# Patient Record
Sex: FEMALE | Race: WHITE | Employment: OTHER | URBAN - METROPOLITAN AREA
[De-identification: names, ages, dates, MRNs, and addresses within clinical notes are randomized per-mention and may not be internally consistent; named-entity substitution may affect disease eponyms.]

---

## 2023-10-09 PROBLEM — H52.4 MYOPIA OF BOTH EYES WITH ASTIGMATISM AND PRESBYOPIA: Status: ACTIVE | Noted: 2023-10-09

## 2023-10-09 PROBLEM — H35.30 AMD (AGE-RELATED MACULAR DEGENERATION), BILATERAL: Status: ACTIVE | Noted: 2023-10-09

## 2023-10-09 PROBLEM — Z96.1: Status: ACTIVE | Noted: 2023-10-09

## 2023-10-09 PROBLEM — Z96.1 PSEUDOPHAKIA OF BOTH EYES: Status: ACTIVE | Noted: 2023-10-09

## 2023-10-09 PROBLEM — Z17.0 MALIGNANT NEOPLASM OF UPPER-OUTER QUADRANT OF LEFT BREAST IN FEMALE, ESTROGEN RECEPTOR POSITIVE (MULTI): Status: ACTIVE | Noted: 2023-10-09

## 2023-10-09 PROBLEM — C50.412 MALIGNANT NEOPLASM OF UPPER-OUTER QUADRANT OF LEFT BREAST IN FEMALE, ESTROGEN RECEPTOR POSITIVE (MULTI): Status: ACTIVE | Noted: 2023-10-09

## 2023-10-09 PROBLEM — H26.491 PCO (POSTERIOR CAPSULAR OPACIFICATION), RIGHT: Status: ACTIVE | Noted: 2023-10-09

## 2023-10-09 PROBLEM — H52.13 MYOPIA OF BOTH EYES WITH ASTIGMATISM AND PRESBYOPIA: Status: ACTIVE | Noted: 2023-10-09

## 2023-10-09 PROBLEM — C77.3: Status: ACTIVE | Noted: 2023-10-09

## 2023-10-09 PROBLEM — H52.31 ANISOMETROPIA: Status: ACTIVE | Noted: 2023-10-09

## 2023-10-09 PROBLEM — C50.912 BREAST CANCER, LEFT BREAST (MULTI): Status: ACTIVE | Noted: 2023-10-09

## 2023-10-09 PROBLEM — H52.203 MYOPIA OF BOTH EYES WITH ASTIGMATISM AND PRESBYOPIA: Status: ACTIVE | Noted: 2023-10-09

## 2023-10-09 PROBLEM — C50.919 CARCINOMA OF BREAST (MULTI): Status: ACTIVE | Noted: 2023-10-09

## 2023-10-09 PROBLEM — H31.013: Status: ACTIVE | Noted: 2023-10-09

## 2023-10-09 PROBLEM — H35.353 CME (CYSTOID MACULAR EDEMA), BILATERAL: Status: ACTIVE | Noted: 2023-10-09

## 2023-10-09 PROBLEM — R91.8 LUNG MASS: Status: ACTIVE | Noted: 2023-10-09

## 2023-10-09 PROBLEM — C50.919: Status: ACTIVE | Noted: 2023-10-09

## 2023-10-09 RX ORDER — ANASTROZOLE 1 MG/1
TABLET ORAL
COMMUNITY
Start: 2020-02-07

## 2023-10-09 RX ORDER — FOLIC ACID 1 MG/1
TABLET ORAL
COMMUNITY
Start: 2014-06-25

## 2023-10-09 RX ORDER — LANOLIN ALCOHOL/MO/W.PET/CERES
CREAM (GRAM) TOPICAL
COMMUNITY
Start: 2014-06-25

## 2023-10-09 RX ORDER — ASPIRIN 81 MG/1
TABLET ORAL
COMMUNITY
Start: 2014-06-25

## 2023-10-09 RX ORDER — PREDNISOLONE ACETATE 10 MG/ML
1 SUSPENSION/ DROPS OPHTHALMIC 4 TIMES DAILY
COMMUNITY
Start: 2017-10-13

## 2023-10-09 RX ORDER — METOPROLOL TARTRATE 25 MG/1
TABLET, FILM COATED ORAL
COMMUNITY
Start: 2014-03-26

## 2023-10-09 RX ORDER — ATORVASTATIN CALCIUM 10 MG/1
TABLET, FILM COATED ORAL
COMMUNITY
Start: 2014-06-25

## 2023-10-10 ENCOUNTER — OFFICE VISIT (OUTPATIENT)
Dept: HEMATOLOGY/ONCOLOGY | Facility: CLINIC | Age: 83
End: 2023-10-10
Payer: MEDICARE

## 2023-10-10 ENCOUNTER — OFFICE VISIT (OUTPATIENT)
Dept: PALLIATIVE MEDICINE | Facility: CLINIC | Age: 83
End: 2023-10-10
Payer: MEDICARE

## 2023-10-10 VITALS
BODY MASS INDEX: 30.76 KG/M2 | RESPIRATION RATE: 16 BRPM | TEMPERATURE: 96.1 F | OXYGEN SATURATION: 90 % | SYSTOLIC BLOOD PRESSURE: 142 MMHG | WEIGHT: 196.21 LBS | HEART RATE: 77 BPM | DIASTOLIC BLOOD PRESSURE: 72 MMHG

## 2023-10-10 DIAGNOSIS — Z51.5 PALLIATIVE CARE ENCOUNTER: Primary | ICD-10-CM

## 2023-10-10 DIAGNOSIS — Z17.0 MALIGNANT NEOPLASM OF UPPER-OUTER QUADRANT OF LEFT BREAST IN FEMALE, ESTROGEN RECEPTOR POSITIVE (MULTI): Primary | ICD-10-CM

## 2023-10-10 DIAGNOSIS — C50.412 MALIGNANT NEOPLASM OF UPPER-OUTER QUADRANT OF LEFT BREAST IN FEMALE, ESTROGEN RECEPTOR POSITIVE (MULTI): Primary | ICD-10-CM

## 2023-10-10 PROCEDURE — 99212 OFFICE O/P EST SF 10 MIN: CPT

## 2023-10-10 PROCEDURE — 99214 OFFICE O/P EST MOD 30 MIN: CPT | Performed by: STUDENT IN AN ORGANIZED HEALTH CARE EDUCATION/TRAINING PROGRAM

## 2023-10-10 PROCEDURE — 1125F AMNT PAIN NOTED PAIN PRSNT: CPT | Performed by: STUDENT IN AN ORGANIZED HEALTH CARE EDUCATION/TRAINING PROGRAM

## 2023-10-10 PROCEDURE — 1159F MED LIST DOCD IN RCRD: CPT | Performed by: STUDENT IN AN ORGANIZED HEALTH CARE EDUCATION/TRAINING PROGRAM

## 2023-10-10 PROCEDURE — 1159F MED LIST DOCD IN RCRD: CPT

## 2023-10-10 PROCEDURE — 1125F AMNT PAIN NOTED PAIN PRSNT: CPT

## 2023-10-10 ASSESSMENT — PAIN SCALES - GENERAL: PAINLEVEL: 5

## 2023-10-10 NOTE — PROGRESS NOTES
Patient ID: Edilma Davis is a 83 y.o. female.    The patient presents to clinic today for her history of breast cancer.    Diagnostic/Therapeutic History:  Cancer History:          Breast Cancer         AJCC Edition: 7th, Diagnosis Date: 10-Jul-2013, Stage IIB, T2 pN1 M0      Treatment Synopsis:    1. Clinical 4 cm left-sided invasive breast cancer. Biopsy revealing invasive breast cancer. Estrogen receptor strongly positive at greater than 95%, progesterone  receptor weakly positive at 20%, and HER-2/tyrel negative by immunohistochemistry with a score of 0.      2. Biopsy of a left axillary lymph node is positive for cancer.      3. Bone scan is negative for metastases. 4. Computed tomography of the chest revealing some small 3 mm scattered nodules. Computed tomography of the abdomen revealing multiple liver cysts confirmed by ultrasound. 5. Started on exemestane. 6. Episode  of TIA but elected to continue on exemestane.     8. Progression in left breast and axilla by mammography, so exemestane stopped and started on Tamoxifen on 8/10/18.      9. Repeat PET/CT showing uptake in left breast and axilla, as well as new nodule in right lobe of the lung, so tamoxifen stopped and started on letrozole.      10. Progression on letrozole, so abemaciclib added to treatment.      11.PET cerianna torso on 10/13/22 showed the previously described FDG avid lesions consistent with sites of malignant involvement, these include irregular soft tissue lesion in the left breast, the pleural-based right upper lobe pulmonary nodule in the  focal lesion within the T8 vertebral body.  She has multiple additional osseous lesions consistent with menstrual slides of osseous metastatic disease with prominent subcentimeter trick right supraclavicular and subcarinal lymph nodes demonstrating focal  F-18 fluoroestradiall level and avidity consistent with alla mets- To note that patient has been off verzenio for few months, she stopped it, agreed  to restart at lower dose but has been taking intermittently, however has stopped      05/2023: scans reviewed: she has increaase in lung nodule and subcarinal LN, new bone mets- agreed to restart verzenio       History of Present Illness (HPI)/Interval History:    Patient has diarrhea, on verzenio affecting her quality of life. Decided to stop late September. No fever, no chills, no chest pain, no SOB    Review of Systems:  14-point ROS otherwise negative, as per HPI.    Past Medical History:   Diagnosis Date    Essential (primary) hypertension 08/18/2013    Benign essential hypertension    Malignant neoplasm of unspecified site of left female breast (CMS/[facility]ly [facility]) 02/28/2020    Breast cancer, left breast       Past Surgical History:   Procedure Laterality Date    CATARACT EXTRACTION  09/19/2017    Cataract Extraction    TONSILLECTOMY  04/02/2014    Tonsillectomy       Social History     Socioeconomic History    Marital status:      Spouse name: Not on file    Number of children: Not on file    Years of education: Not on file    Highest education level: Not on file   Occupational History    Not on file   Tobacco Use    Smoking status: Not on file    Smokeless tobacco: Not on file   Substance and Sexual Activity    Alcohol use: Not on file    Drug use: Not on file    Sexual activity: Not on file   Other Topics Concern    Not on file   Social History Narrative    Not on file     Social Determinants of Health     Financial Resource Strain: Not on file   Food Insecurity: Not on file   Transportation Needs: Not on file   Physical Activity: Not on file   Stress: Not on file   Social Connections: Not on file   Intimate Partner Violence: Not on file   Housing Stability: Not on file       No Known Allergies      Current Outpatient Medications:     abemaciclib (Verzenio) 100 mg tablet, TAKE ONE (1) TABLET BY MOUTH 2 TIMES DAILY. (Patient not taking: Reported on 10/10/2023), Disp: 60 tablet, Rfl: 3    anastrozole (Arimidex)  1 mg tablet, Take by mouth., Disp: , Rfl:     aspirin 81 mg EC tablet, Take by mouth., Disp: , Rfl:     atorvastatin (Lipitor) 10 mg tablet, Take by mouth., Disp: , Rfl:     folic acid (Folvite) 1 mg tablet, Take by mouth., Disp: , Rfl:     metoprolol tartrate (Lopressor) 25 mg tablet, Take by mouth., Disp: , Rfl:     prednisoLONE acetate (Pred-Forte) 1 % ophthalmic suspension, Administer 1 drop into the right eye 4 times a day., Disp: , Rfl:     thiamine 100 mg tablet, Take by mouth., Disp: , Rfl:      Objective    BSA: There is no height or weight on file to calculate BSA.  There were no vitals taken for this visit.        Physical Exam    Constitutional: Well developed, awake/alert/oriented  x3, no distress, alert and cooperative   Eyes: EOMI, clear sclerae   Head/Neck: Neck supple, thyroid without mass or tenderness,  no JVD, trachea midline, no bruits   Respiratory/Thorax: Patent airways, CTAB, normal  breath sounds with good chest expansion, thorax symmetric   Cardiovascular: Regular, rate and rhythm, no murmurs,  2+ equal pulses of the extremities, normal S 1and S 2   Gastrointestinal: Nondistended, soft, non-tender,  no rebound tenderness or guarding, no masses, no organomegaly, +BS   Breast: left inverted nipple with palpated mass   Psychological: Appropriate mood and behavior   Skin: Warm and dry, no lesions, no rashes       Laboratory Data:  Lab Results   Component Value Date    WBC 6.6 06/07/2023    HGB 12.7 06/07/2023    HCT 39.7 06/07/2023    MCV 92 06/07/2023     06/07/2023       Chemistry    Lab Results   Component Value Date/Time     06/07/2023 1243    K 4.0 06/07/2023 1243     06/07/2023 1243    CO2 29 06/07/2023 1243    BUN 19 06/07/2023 1243    CREATININE 0.97 06/07/2023 1243    Lab Results   Component Value Date/Time    CALCIUM 9.3 06/07/2023 1243    ALKPHOS 99 06/07/2023 1243    AST 22 06/07/2023 1243    ALT 11 06/07/2023 1243    BILITOT 0.4 06/07/2023 124              Assessment/Plan:    83 year old female with multiple comorbidities, with history of metastatic, stage IV breast cancer with recent PET scan showing irregular soft tissue left breast,  pleural-based right upper lobe nodule, supraclavicular subcarinal lymph nodes (new), and multiple osseous metastatic disease(new).  She had multiple lines of hormonal treatments, with chemotherapy not being considered because of her poor performance status.   Review cancer history for details of her previous treatment.  She  was on letrozole and Verzenio and her disease has been stable overall, she does mention having stopped Verzenio in the past 3 months at least because of diarrhea which would explain  her progression of disease.     PET cerianna torso on 10/13/22 showed the previously described FDG avid lesions consistent with sites of malignant involvement, these include irregular soft tissue lesion in the left  breast, the pleural-based right upper lobe pulmonary nodule in the focal lesion within the T8 vertebral body.  She has multiple additional osseous lesions consistent with menstrual slides of osseous metastatic disease with prominent subcentimeter trick  right supraclavicular and subcarinal lymph nodes demonstrating focal F-18 fluoroestradiall level and avidity consistent with alla mets-      discussed with her the results of her PET/CT on and we agreed to restart Verzenio at a lower dose 100mg daily and to follow-up closely.  she was taking verzenio every couple of days and then stopped.     repeat scans reviewed: she has increaase in lung nodule and subcarinal LN, new bone mets- we discussed no treatment with fup scans vs restarting verzenio- agreable to restart verzenio once a day- (that was in june and she never started it)    Started verzenio then stopped because of diarrhea- and per discussion Edilma wants to preserve a good quality of life. We will follow up in early December. In the meantime, instructed to call for  any questions/concerns. We did discuss that cancer will get worse off treatment. Agreable with plan. Discussed in presence of JAIMIE Jackson    RTC 12/12  Fup KAPIL Almeida          At least 35 minutes of direct consultation was spent reviewing the patient's chart as well as discussing and  reviewing the  cancer care plan including educating and answering  questions and concerns, greater than 50 percent spent in counseling and coordination  of care.            Alley Kapadia MD  Hematology and Medical Oncology  [facility]

## 2023-10-10 NOTE — PATIENT INSTRUCTIONS
Follow up with Dr Kapadia in 2 months   If you have any questions or concerns please call [phone]

## 2023-10-11 ENCOUNTER — TELEPHONE (OUTPATIENT)
Dept: ADMISSION | Facility: HOSPITAL | Age: 83
End: 2023-10-11
Payer: MEDICARE

## 2023-10-11 NOTE — TELEPHONE ENCOUNTER
Pt's [facility] called for a refill on her Verzenio. There are refills on file so I will call and let them know. However, it is marked as no longer taking in her med list from yesterday. Message sent to the team to confirm if she is taking this.

## 2023-10-12 NOTE — PROGRESS NOTES
SUPPORTIVE AND PALLIATIVE ONCOLOGY OUTPATIENT FOLLOW-UP      SERVICE DATE: 10/10/2023    Subjective   HISTORY OF PRESENT ILLNESS: Edilma Davis is a 83 y.o. female who presents with a history of left sided invasive breast cancer. She has been treated with Exemestane, tamoxifen, and Letrozole with progression  on each treatment. She was started on Verzenio for progression. Repeat CT scan 05/2023 with increase in lung nodule, subcarinal LN, and new bone mets. She restarted Verzenio, but has not been taking this consistently.     Pain Assessment:  Pain Score:   5/10  Location: Knee  Description: chronic knee pain. No change with the increase in gabapentin for pain. States that her leg throbs constantly.     Symptom Assessment:  Pain:somewhat  Headache: none  Dizziness:none  Lack of energy: a little  Difficulty sleeping: none  Worrying: none  Anxiety: none  Depression: none  Pain in mouth/swallowing: none  Dry mouth: none  Taste changes: none  Shortness of breath: none  Lack of appetite: a little when on Verzenio    Nausea: none  Vomiting: none  Constipation: none  Diarrhea: a little while on Verzenio. Tried Imodium with no relief.   Sore muscles: none  Numbness or tingling in hands/feet/other: none  Weight loss: none  Other: none      Information obtained from: chart review and interview of patient  ______________________________________________________________________        Objective        PHYSICAL EXAMINATION   Vital signs reviewed  Vitals:    10/10/23 1454   BP: 142/72   Pulse: 77   Resp: 16   Temp: 35.6 °C (96.1 °F)   SpO2: 90%       Physical Exam  Musculoskeletal:      Comments: In a wheelchair   Skin:     General: Skin is warm.   Neurological:      Mental Status: She is alert and oriented to person, place, and time.   Psychiatric:         Mood and Affect: Mood normal.         ASSESSMENT/PLAN    Pain  Pain is: chronic  Type: somatic and neuropathic  Pain control: well-controlled  Home regimen:   - Continue  Tylenol 500-1000 mg every 8 hours as needed  - Continue Gabapentin 300 mg TID. Instructed her to take 300 in the morning and 600 at bedtime to make sure she gets the full dosing in.     Diarrhea:  - Continue Imodium 2 mg with each episode of diarrhea. Do not exceed 16 mg/day.     Supportive and Palliative Oncology encounter:  Spoke with patient and anthony at bedside  Emotional support provided  Coordination of care  We will continue to follow and address symptoms as needed    Medical Decision Making/Goals of Care/Advance Care Planning:  Patient's current clinical condition, including diagnosis, prognosis, and management plan, and goals of care were discussed.   Life limiting disease: metastatic malignancy  Family: Supportive   Performance status: Major limitations due to disease process and diarrhea  Information:Wants full disclosure  Minimum acceptable outcome/QOL:  patient does not want to continue with Verzenio. She understands the risk of her disease spreading if she does not want any cancer directed therapy. Patient verbalizes understanding and would like to continue with no treatment. She declined in home palliative care at this time.     Advance Directives  Existence of Advance Directives:Yes, documentation or copy in medical record  Decision maker: HCPOA is Anthony Jackson  Code Status: Full code    Next Follow-Up Visit:  Return to clinic as needed    Signature and billing  Medical complexity was low level due to due to complexity of problems, extensive data review, and high risk of management/treatment.  Time was spent on the following: Prep Time, Time Directly with Patient/Family/Caregiver, Documentation Time. Total time spent: 15      Data  Diagnostic tests and information reviewed for today's visit:  Conversation with primary team, Medications         Some elements copied from Nina Ng note on 8/29/23, the elements have been updated and all reflect current decision making from today,  10/12/2023.      Plan of Care discussed with: Provider, Patient, and Family/Significant Other    SIGNATURE: KAPIL Hanks    Contact information:  Supportive and Palliative Oncology  Monday-Friday 8 AM-5 PM  Phone:  [phone], press option #5, then option #1.   Or Epic Secure Chat

## 2023-10-16 ENCOUNTER — SPECIALTY PHARMACY (OUTPATIENT)
Dept: PHARMACY | Facility: CLINIC | Age: 83
End: 2023-10-16

## 2023-11-08 ENCOUNTER — PHARMACY VISIT (OUTPATIENT)
Dept: PHARMACY | Facility: CLINIC | Age: 83
End: 2023-11-08
Payer: COMMERCIAL

## 2023-11-08 PROCEDURE — RXMED WILLOW AMBULATORY MEDICATION CHARGE

## 2023-12-08 ENCOUNTER — SPECIALTY PHARMACY (OUTPATIENT)
Dept: PHARMACY | Facility: CLINIC | Age: 83
End: 2023-12-08

## 2023-12-08 PROCEDURE — RXMED WILLOW AMBULATORY MEDICATION CHARGE

## 2023-12-11 ENCOUNTER — PHARMACY VISIT (OUTPATIENT)
Dept: PHARMACY | Facility: CLINIC | Age: 83
End: 2023-12-11
Payer: COMMERCIAL

## 2023-12-12 ENCOUNTER — OFFICE VISIT (OUTPATIENT)
Dept: HEMATOLOGY/ONCOLOGY | Facility: CLINIC | Age: 83
End: 2023-12-12
Payer: MEDICARE

## 2023-12-12 VITALS
WEIGHT: 195.22 LBS | BODY MASS INDEX: 30.6 KG/M2 | DIASTOLIC BLOOD PRESSURE: 83 MMHG | TEMPERATURE: 96.8 F | HEART RATE: 75 BPM | RESPIRATION RATE: 14 BRPM | OXYGEN SATURATION: 92 % | SYSTOLIC BLOOD PRESSURE: 175 MMHG

## 2023-12-12 DIAGNOSIS — Z17.0 MALIGNANT NEOPLASM OF UPPER-OUTER QUADRANT OF LEFT BREAST IN FEMALE, ESTROGEN RECEPTOR POSITIVE (MULTI): ICD-10-CM

## 2023-12-12 DIAGNOSIS — C50.412 MALIGNANT NEOPLASM OF UPPER-OUTER QUADRANT OF LEFT BREAST IN FEMALE, ESTROGEN RECEPTOR POSITIVE (MULTI): ICD-10-CM

## 2023-12-12 PROCEDURE — 1159F MED LIST DOCD IN RCRD: CPT | Performed by: STUDENT IN AN ORGANIZED HEALTH CARE EDUCATION/TRAINING PROGRAM

## 2023-12-12 PROCEDURE — 1126F AMNT PAIN NOTED NONE PRSNT: CPT | Performed by: STUDENT IN AN ORGANIZED HEALTH CARE EDUCATION/TRAINING PROGRAM

## 2023-12-12 PROCEDURE — 99214 OFFICE O/P EST MOD 30 MIN: CPT | Performed by: STUDENT IN AN ORGANIZED HEALTH CARE EDUCATION/TRAINING PROGRAM

## 2023-12-12 ASSESSMENT — ENCOUNTER SYMPTOMS
LOSS OF SENSATION IN FEET: 0
OCCASIONAL FEELINGS OF UNSTEADINESS: 1
DEPRESSION: 0

## 2023-12-12 ASSESSMENT — PAIN SCALES - GENERAL: PAINLEVEL: 0-NO PAIN

## 2023-12-12 NOTE — PROGRESS NOTES
Patient ID: Edilma Davis is a 83 y.o. female.    The patient presents to clinic today for her history of breast cancer.    Diagnostic/Therapeutic History:  Cancer History:          Breast Cancer         AJCC Edition: 7th, Diagnosis Date: 10-Jul-2013, Stage IIB, T2 pN1 M0      Treatment Synopsis:    1. Clinical 4 cm left-sided invasive breast cancer. Biopsy revealing invasive breast cancer. Estrogen receptor strongly positive at greater than 95%, progesterone  receptor weakly positive at 20%, and HER-2/tyrel negative by immunohistochemistry with a score of 0.      2. Biopsy of a left axillary lymph node is positive for cancer.      3. Bone scan is negative for metastases. 4. Computed tomography of the chest revealing some small 3 mm scattered nodules. Computed tomography of the abdomen revealing multiple liver cysts confirmed by ultrasound. 5. Started on exemestane. 6. Episode  of TIA but elected to continue on exemestane.     8. Progression in left breast and axilla by mammography, so exemestane stopped and started on Tamoxifen on 8/10/18.      9. Repeat PET/CT showing uptake in left breast and axilla, as well as new nodule in right lobe of the lung, so tamoxifen stopped and started on letrozole.      10. Progression on letrozole, so abemaciclib added to treatment.      11.PET cerianna torso on 10/13/22 showed the previously described FDG avid lesions consistent with sites of malignant involvement, these include irregular soft tissue lesion in the left breast, the pleural-based right upper lobe pulmonary nodule in the  focal lesion within the T8 vertebral body.  She has multiple additional osseous lesions consistent with menstrual slides of osseous metastatic disease with prominent subcentimeter trick right supraclavicular and subcarinal lymph nodes demonstrating focal  F-18 fluoroestradiall level and avidity consistent with alla mets- To note that patient has been off verzenio for few months, she stopped it, agreed  to restart at lower dose but has been taking intermittently, however has stopped      05/2023: scans reviewed: she has increaase in lung nodule and subcarinal LN, new bone mets- agreed to restart verzenio- was taking it intermittently stopped in late september       History of Present Illness (HPI)/Interval History:    Patient had diarrhea, on verzenio affecting her quality of life. Decided to stop late September. No fever, no chills, no chest pain, no SOB. Doing better with better quality of life off treatment.     Review of Systems:  14-point ROS otherwise negative, as per HPI.    Past Medical History:   Diagnosis Date    Essential (primary) hypertension 08/18/2013    Benign essential hypertension    Malignant neoplasm of unspecified site of left female breast (CMS/HCC) 02/28/2020    Breast cancer, left breast       Past Surgical History:   Procedure Laterality Date    CATARACT EXTRACTION  09/19/2017    Cataract Extraction    TONSILLECTOMY  04/02/2014    Tonsillectomy       Social History     Socioeconomic History    Marital status:      Spouse name: None    Number of children: None    Years of education: None    Highest education level: None   Occupational History    None   Tobacco Use    Smoking status: None    Smokeless tobacco: None   Substance and Sexual Activity    Alcohol use: None    Drug use: None    Sexual activity: None   Other Topics Concern    None   Social History Narrative    None     Social Determinants of Health     Financial Resource Strain: Not on file   Food Insecurity: Not on file   Transportation Needs: Not on file   Physical Activity: Not on file   Stress: Not on file   Social Connections: Not on file   Intimate Partner Violence: Not on file   Housing Stability: Not on file       No Known Allergies      Current Outpatient Medications:     abemaciclib (Verzenio) 100 mg tablet, TAKE ONE (1) TABLET BY MOUTH 2 TIMES DAILY. (Patient not taking: Reported on 10/10/2023), Disp: 60 tablet,  Rfl: 3    anastrozole (Arimidex) 1 mg tablet, Take by mouth., Disp: , Rfl:     aspirin 81 mg EC tablet, Take by mouth., Disp: , Rfl:     atorvastatin (Lipitor) 10 mg tablet, Take by mouth., Disp: , Rfl:     folic acid (Folvite) 1 mg tablet, Take by mouth., Disp: , Rfl:     metoprolol tartrate (Lopressor) 25 mg tablet, Take by mouth., Disp: , Rfl:     prednisoLONE acetate (Pred-Forte) 1 % ophthalmic suspension, Administer 1 drop into the right eye 4 times a day., Disp: , Rfl:     thiamine 100 mg tablet, Take by mouth., Disp: , Rfl:      Objective    BSA: 2.05 meters squared  /83 (BP Location: Left arm, Patient Position: Sitting)   Pulse 75   Temp 36 °C (96.8 °F) (Temporal)   Resp 14   Wt 88.6 kg (195 lb 3.5 oz)   SpO2 92%   BMI 30.60 kg/m²         Physical Exam    Constitutional: Well developed, awake/alert/oriented  x3, no distress, alert and cooperative   Eyes: EOMI, clear sclerae   Head/Neck: Neck supple, thyroid without mass or tenderness,  no JVD, trachea midline, no bruits   Respiratory/Thorax: Patent airways, CTAB, normal  breath sounds with good chest expansion, thorax symmetric   Cardiovascular: Regular, rate and rhythm, no murmurs,  2+ equal pulses of the extremities, normal S 1and S 2   Gastrointestinal: Nondistended, soft, non-tender,  no rebound tenderness or guarding, no masses, no organomegaly, +BS   Breast: left inverted nipple with palpated mass-    Psychological: Appropriate mood and behavior   Skin: Warm and dry, no lesions, no rashes       Laboratory Data:  Lab Results   Component Value Date    WBC 6.6 06/07/2023    HGB 12.7 06/07/2023    HCT 39.7 06/07/2023    MCV 92 06/07/2023     06/07/2023       Chemistry    Lab Results   Component Value Date/Time     06/07/2023 1243    K 4.0 06/07/2023 1243     06/07/2023 1243    CO2 29 06/07/2023 1243    BUN 19 06/07/2023 1243    CREATININE 0.97 06/07/2023 1243    Lab Results   Component Value Date/Time    CALCIUM 9.3  06/07/2023 1243    ALKPHOS 99 06/07/2023 1243    AST 22 06/07/2023 1243    ALT 11 06/07/2023 1243    BILITOT 0.4 06/07/2023 1243             Assessment/Plan:    83 year old female with multiple comorbidities, with history of metastatic, stage IV breast cancer with recent PET scan showing irregular soft tissue left breast,  pleural-based right upper lobe nodule, supraclavicular subcarinal lymph nodes (new), and multiple osseous metastatic disease(new).  She had multiple lines of hormonal treatments, with chemotherapy not being considered because of her poor performance status. Review cancer history for details of her previous treatment. She  was on letrozole and Verzenio and her disease has been stable overall, she does mention having stopped Verzenio in the past 3 months at least because of diarrhea which would explain  her progression of disease.     PET cerianna torso on 10/13/22 showed the previously described FDG avid lesions consistent with sites of malignant involvement, these include irregular soft tissue lesion in the left  breast, the pleural-based right upper lobe pulmonary nodule in the focal lesion within the T8 vertebral body.  She has multiple additional osseous lesions consistent with menstrual slides of osseous metastatic disease with prominent subcentimeter trick  right supraclavicular and subcarinal lymph nodes demonstrating focal F-18 fluoroestradiall level and avidity consistent with alla mets-      discussed with her the results of her PET/CT on and we agreed to restart Verzenio at a lower dose 100mg daily and to follow-up closely.  she was taking verzenio every couple of days and then stopped.     repeat scans reviewed: she has increaase in lung nodule and subcarinal LN, new bone mets- we discussed no treatment with fup scans vs restarting verzenio- agreable to restart verzenio once a day- (that was in june and she never started it)    Started verzenio then stopped because of diarrhea- and per  discussion Edilma wants to preserve a good quality of life. We will follow up in 1M. In the meantime, instructed to call for any questions/concerns. We did discuss that cancer will get worse off treatment. Agreable with plan. Discussed in presence of JAIMIE Jackson    RTC 1/16  FuKAPIL Lowe          At least 35 minutes of direct consultation was spent reviewing the patient's chart as well as discussing and  reviewing the  cancer care plan including educating and answering  questions and concerns, greater than 50 percent spent in counseling and coordination  of care.            Alley Kapadia MD  Hematology and Medical Oncology  [facility]

## 2024-01-16 ENCOUNTER — SPECIALTY PHARMACY (OUTPATIENT)
Dept: PHARMACY | Facility: CLINIC | Age: 84
End: 2024-01-16

## 2024-01-16 ENCOUNTER — OFFICE VISIT (OUTPATIENT)
Dept: HEMATOLOGY/ONCOLOGY | Facility: CLINIC | Age: 84
End: 2024-01-16
Payer: MEDICARE

## 2024-01-16 VITALS
TEMPERATURE: 97 F | WEIGHT: 188.27 LBS | SYSTOLIC BLOOD PRESSURE: 157 MMHG | HEART RATE: 85 BPM | OXYGEN SATURATION: 89 % | DIASTOLIC BLOOD PRESSURE: 83 MMHG | RESPIRATION RATE: 16 BRPM | BODY MASS INDEX: 29.52 KG/M2

## 2024-01-16 DIAGNOSIS — C50.412 MALIGNANT NEOPLASM OF UPPER-OUTER QUADRANT OF LEFT BREAST IN FEMALE, ESTROGEN RECEPTOR POSITIVE (MULTI): ICD-10-CM

## 2024-01-16 DIAGNOSIS — Z17.0 MALIGNANT NEOPLASM OF UPPER-OUTER QUADRANT OF LEFT BREAST IN FEMALE, ESTROGEN RECEPTOR POSITIVE (MULTI): ICD-10-CM

## 2024-01-16 PROCEDURE — 1159F MED LIST DOCD IN RCRD: CPT | Performed by: STUDENT IN AN ORGANIZED HEALTH CARE EDUCATION/TRAINING PROGRAM

## 2024-01-16 PROCEDURE — 1126F AMNT PAIN NOTED NONE PRSNT: CPT | Performed by: STUDENT IN AN ORGANIZED HEALTH CARE EDUCATION/TRAINING PROGRAM

## 2024-01-16 PROCEDURE — 99214 OFFICE O/P EST MOD 30 MIN: CPT | Performed by: STUDENT IN AN ORGANIZED HEALTH CARE EDUCATION/TRAINING PROGRAM

## 2024-01-16 ASSESSMENT — PAIN SCALES - GENERAL: PAINLEVEL: 0-NO PAIN

## 2024-01-16 NOTE — PROGRESS NOTES
Patient ID: Edilma Davis is a 83 y.o. female.    The patient presents to clinic today for her history of breast cancer.    Diagnostic/Therapeutic History:  Cancer History:          Breast Cancer         AJCC Edition: 7th, Diagnosis Date: 10-Jul-2013, Stage IIB, T2 pN1 M0      Treatment Synopsis:    1. Clinical 4 cm left-sided invasive breast cancer. Biopsy revealing invasive breast cancer. Estrogen receptor strongly positive at greater than 95%, progesterone  receptor weakly positive at 20%, and HER-2/tyrel negative by immunohistochemistry with a score of 0.      2. Biopsy of a left axillary lymph node is positive for cancer.      3. Bone scan is negative for metastases. 4. Computed tomography of the chest revealing some small 3 mm scattered nodules. Computed tomography of the abdomen revealing multiple liver cysts confirmed by ultrasound. 5. Started on exemestane. 6. Episode  of TIA but elected to continue on exemestane.     8. Progression in left breast and axilla by mammography, so exemestane stopped and started on Tamoxifen on 8/10/18.      9. Repeat PET/CT showing uptake in left breast and axilla, as well as new nodule in right lobe of the lung, so tamoxifen stopped and started on letrozole.      10. Progression on letrozole, so abemaciclib added to treatment.      11.PET cerianna torso on 10/13/22 showed the previously described FDG avid lesions consistent with sites of malignant involvement, these include irregular soft tissue lesion in the left breast, the pleural-based right upper lobe pulmonary nodule in the  focal lesion within the T8 vertebral body.  She has multiple additional osseous lesions consistent with menstrual slides of osseous metastatic disease with prominent subcentimeter trick right supraclavicular and subcarinal lymph nodes demonstrating focal  F-18 fluoroestradiall level and avidity consistent with alla mets- To note that patient has been off verzenio for few months, she stopped it, agreed  to restart at lower dose but has been taking intermittently, however has stopped      05/2023: scans reviewed: she has increaase in lung nodule and subcarinal LN, new bone mets- agreed to restart verzenio- was taking it intermittently stopped in late september       History of Present Illness (HPI)/Interval History:        Pain in the left shoulder- intermittent- severity: 5/10  Does have diarrhea- intermittent  No nausea, no vomitting  No fever, or chills      Review of Systems:  14-point ROS otherwise negative, as per HPI.    Past Medical History:   Diagnosis Date    Essential (primary) hypertension 08/18/2013    Benign essential hypertension    Malignant neoplasm of unspecified site of left female breast (CMS/[facility]) 02/28/2020    Breast cancer, left breast       Past Surgical History:   Procedure Laterality Date    CATARACT EXTRACTION  09/19/2017    Cataract Extraction    TONSILLECTOMY  04/02/2014    Tonsillectomy       Social History     Socioeconomic History    Marital status:      Spouse name: None    Number of children: None    Years of education: None    Highest education level: None   Occupational History    None   Tobacco Use    Smoking status: None    Smokeless tobacco: None   Substance and Sexual Activity    Alcohol use: None    Drug use: None    Sexual activity: None   Other Topics Concern    None   Social History Narrative    None     Social Determinants of Health     Financial Resource Strain: Not on file   Food Insecurity: Not on file   Transportation Needs: Not on file   Physical Activity: Not on file   Stress: Not on file   Social Connections: Not on file   Intimate Partner Violence: Not on file   Housing Stability: Not on file       No Known Allergies      Current Outpatient Medications:     abemaciclib (Verzenio) 100 mg tablet, TAKE ONE (1) TABLET BY MOUTH 2 TIMES DAILY. (Patient not taking: Reported on 10/10/2023), Disp: 60 tablet, Rfl: 3    anastrozole (Arimidex) 1 mg tablet, Take by  mouth., Disp: , Rfl:     aspirin 81 mg EC tablet, Take by mouth., Disp: , Rfl:     atorvastatin (Lipitor) 10 mg tablet, Take by mouth., Disp: , Rfl:     folic acid (Folvite) 1 mg tablet, Take by mouth., Disp: , Rfl:     metoprolol tartrate (Lopressor) 25 mg tablet, Take by mouth., Disp: , Rfl:     prednisoLONE acetate (Pred-Forte) 1 % ophthalmic suspension, Administer 1 drop into the right eye 4 times a day., Disp: , Rfl:     thiamine 100 mg tablet, Take by mouth., Disp: , Rfl:      Objective    BSA: 2.01 meters squared  /83 (BP Location: Left arm, Patient Position: Sitting)   Pulse 85   Temp 36.1 °C (97 °F) (Temporal)   Resp 16   Wt 85.4 kg (188 lb 4.4 oz)   SpO2 (!) 89%   BMI 29.52 kg/m²         Physical Exam    Constitutional: Well developed, awake/alert/oriented  x3, no distress, alert and cooperative   Eyes: EOMI, clear sclerae   Head/Neck: Neck supple, thyroid without mass or tenderness,  no JVD, trachea midline, no bruits   Respiratory/Thorax: Patent airways, CTAB, normal  breath sounds with good chest expansion, thorax symmetric   Cardiovascular: Regular, rate and rhythm, no murmurs,  2+ equal pulses of the extremities, normal S 1and S 2   Gastrointestinal: Nondistended, soft, non-tender,  no rebound tenderness or guarding, no masses, no organomegaly, +BS   Breast: left inverted nipple with palpated mass-    Psychological: Appropriate mood and behavior   Skin: Warm and dry, no lesions, no rashes       Laboratory Data:  Lab Results   Component Value Date    WBC 6.6 06/07/2023    HGB 12.7 06/07/2023    HCT 39.7 06/07/2023    MCV 92 06/07/2023     06/07/2023       Chemistry    Lab Results   Component Value Date/Time     06/07/2023 1243    K 4.0 06/07/2023 1243     06/07/2023 1243    CO2 29 06/07/2023 1243    BUN 19 06/07/2023 1243    CREATININE 0.97 06/07/2023 1243    Lab Results   Component Value Date/Time    CALCIUM 9.3 06/07/2023 1243    ALKPHOS 99 06/07/2023 1243    AST 22  06/07/2023 1243    ALT 11 06/07/2023 1243    BILITOT 0.4 06/07/2023 1243             Assessment/Plan:    83 year old female with multiple comorbidities, with history of metastatic, stage IV breast cancer with recent PET scan showing irregular soft tissue left breast,  pleural-based right upper lobe nodule, supraclavicular subcarinal lymph nodes (new), and multiple osseous metastatic disease(new).  She had multiple lines of hormonal treatments, with chemotherapy not being considered because of her poor performance status. Review cancer history for details of her previous treatment. She  was on letrozole and Verzenio and her disease has been stable overall, she does mention having stopped Verzenio in the past 3 months at least because of diarrhea which would explain  her progression of disease.     PET cerianna torso on 10/13/22 showed the previously described FDG avid lesions consistent with sites of malignant involvement, these include irregular soft tissue lesion in the left  breast, the pleural-based right upper lobe pulmonary nodule in the focal lesion within the T8 vertebral body.  She has multiple additional osseous lesions consistent with menstrual slides of osseous metastatic disease with prominent subcentimeter trick  right supraclavicular and subcarinal lymph nodes demonstrating focal F-18 fluoroestradiall level and avidity consistent with alla mets-      discussed with her the results of her PET/CT on and we agreed to restart Verzenio at a lower dose 100mg daily and to follow-up closely.  she was taking verzenio every couple of days and then stopped.     repeat scans reviewed: she has increaase in lung nodule and subcarinal LN, new bone mets- we discussed no treatment with fup scans vs restarting verzenio- agreable to restart verzenio once a day- (that was in june and she never started it)    Started verzenio then stopped because of diarrhea- and per discussion Edilma wants to preserve a good quality of  life.  In the meantime, instructed to call for any questions/concerns. We did discuss that cancer will get worse off treatment. Agreable with plan. Discussed in presence of JAIMIE Jackson. Discontinuing Verzenio    RTC in 2M     At least 35 minutes of direct consultation was spent reviewing the patient's chart as well as discussing and  reviewing the  cancer care plan including educating and answering  questions and concerns, greater than 50 percent spent in counseling and coordination  of care.            Alley Kapadia MD  Hematology and Medical Oncology  [facility]

## 2024-02-15 ENCOUNTER — SPECIALTY PHARMACY (OUTPATIENT)
Dept: PHARMACY | Facility: CLINIC | Age: 84
End: 2024-02-15

## 2024-03-20 ENCOUNTER — OFFICE VISIT (OUTPATIENT)
Dept: HEMATOLOGY/ONCOLOGY | Facility: CLINIC | Age: 84
End: 2024-03-20
Payer: MEDICARE

## 2024-03-20 ENCOUNTER — LAB (OUTPATIENT)
Dept: LAB | Facility: CLINIC | Age: 84
End: 2024-03-20
Payer: MEDICARE

## 2024-03-20 VITALS
BODY MASS INDEX: 29.07 KG/M2 | HEART RATE: 76 BPM | RESPIRATION RATE: 16 BRPM | TEMPERATURE: 97 F | OXYGEN SATURATION: 97 % | WEIGHT: 185.41 LBS | DIASTOLIC BLOOD PRESSURE: 73 MMHG | SYSTOLIC BLOOD PRESSURE: 156 MMHG

## 2024-03-20 DIAGNOSIS — C50.412 MALIGNANT NEOPLASM OF UPPER-OUTER QUADRANT OF LEFT BREAST IN FEMALE, ESTROGEN RECEPTOR POSITIVE (MULTI): ICD-10-CM

## 2024-03-20 DIAGNOSIS — Z17.0 MALIGNANT NEOPLASM OF UPPER-OUTER QUADRANT OF LEFT BREAST IN FEMALE, ESTROGEN RECEPTOR POSITIVE (MULTI): ICD-10-CM

## 2024-03-20 LAB
CREAT SERPL-MCNC: 0.89 MG/DL (ref 0.5–1.05)
EGFRCR SERPLBLD CKD-EPI 2021: 64 ML/MIN/1.73M*2

## 2024-03-20 PROCEDURE — 1126F AMNT PAIN NOTED NONE PRSNT: CPT | Performed by: STUDENT IN AN ORGANIZED HEALTH CARE EDUCATION/TRAINING PROGRAM

## 2024-03-20 PROCEDURE — 1157F ADVNC CARE PLAN IN RCRD: CPT | Performed by: STUDENT IN AN ORGANIZED HEALTH CARE EDUCATION/TRAINING PROGRAM

## 2024-03-20 PROCEDURE — 1159F MED LIST DOCD IN RCRD: CPT | Performed by: STUDENT IN AN ORGANIZED HEALTH CARE EDUCATION/TRAINING PROGRAM

## 2024-03-20 PROCEDURE — 82565 ASSAY OF CREATININE: CPT

## 2024-03-20 PROCEDURE — 99214 OFFICE O/P EST MOD 30 MIN: CPT | Performed by: STUDENT IN AN ORGANIZED HEALTH CARE EDUCATION/TRAINING PROGRAM

## 2024-03-20 PROCEDURE — 36415 COLL VENOUS BLD VENIPUNCTURE: CPT

## 2024-03-20 ASSESSMENT — PAIN SCALES - GENERAL: PAINLEVEL: 0-NO PAIN

## 2024-03-20 NOTE — PROGRESS NOTES
Patient ID: Edilma Davis is a 83 y.o. female.    The patient presents to clinic today for her history of breast cancer.    Diagnostic/Therapeutic History:  Cancer History:          Breast Cancer         AJCC Edition: 7th, Diagnosis Date: 10-Jul-2013, Stage IIB, T2 pN1 M0      Treatment Synopsis:    1. Clinical 4 cm left-sided invasive breast cancer. Biopsy revealing invasive breast cancer. Estrogen receptor strongly positive at greater than 95%, progesterone  receptor weakly positive at 20%, and HER-2/tyrel negative by immunohistochemistry with a score of 0.      2. Biopsy of a left axillary lymph node is positive for cancer.      3. Bone scan is negative for metastases. 4. Computed tomography of the chest revealing some small 3 mm scattered nodules. Computed tomography of the abdomen revealing multiple liver cysts confirmed by ultrasound. 5. Started on exemestane. 6. Episode  of TIA but elected to continue on exemestane.     8. Progression in left breast and axilla by mammography, so exemestane stopped and started on Tamoxifen on 8/10/18.      9. Repeat PET/CT showing uptake in left breast and axilla, as well as new nodule in right lobe of the lung, so tamoxifen stopped and started on letrozole.      10. Progression on letrozole, so abemaciclib added to treatment.      11.PET cerianna torso on 10/13/22 showed the previously described FDG avid lesions consistent with sites of malignant involvement, these include irregular soft tissue lesion in the left breast, the pleural-based right upper lobe pulmonary nodule in the  focal lesion within the T8 vertebral body.  She has multiple additional osseous lesions consistent with menstrual slides of osseous metastatic disease with prominent subcentimeter trick right supraclavicular and subcarinal lymph nodes demonstrating focal  F-18 fluoroestradiall level and avidity consistent with alla mets- To note that patient has been off verzenio for few months, she stopped it, agreed  to restart at lower dose but has been taking intermittently, however has stopped      05/2023: scans reviewed: she has increaase in lung nodule and subcarinal LN, new bone mets- agreed to restart verzenio- was taking it intermittently stopped in late september       History of Present Illness (HPI)/Interval History:    Fatigue- tired all day- No HA-   Breast mass harder  Bilateral knee pain  Appetite is not very well.  No nausea, no vomitting  No fever, or chills      Review of Systems:  14-point ROS otherwise negative, as per HPI.    Past Medical History:   Diagnosis Date    Essential (primary) hypertension 08/18/2013    Benign essential hypertension    Malignant neoplasm of unspecified site of left female breast (CMS/HCC) 02/28/2020    Breast cancer, left breast       Past Surgical History:   Procedure Laterality Date    CATARACT EXTRACTION  09/19/2017    Cataract Extraction    TONSILLECTOMY  04/02/2014    Tonsillectomy       Social History     Socioeconomic History    Marital status:      Spouse name: None    Number of children: None    Years of education: None    Highest education level: None   Occupational History    None   Tobacco Use    Smoking status: None    Smokeless tobacco: None   Substance and Sexual Activity    Alcohol use: None    Drug use: None    Sexual activity: None   Other Topics Concern    None   Social History Narrative    None     Social Determinants of Health     Financial Resource Strain: Not on file   Food Insecurity: Not on file   Transportation Needs: Not on file   Physical Activity: Not on file   Stress: Not on file   Social Connections: Not on file   Intimate Partner Violence: Not on file   Housing Stability: Not on file       No Known Allergies      Current Outpatient Medications:     anastrozole (Arimidex) 1 mg tablet, Take by mouth., Disp: , Rfl:     aspirin 81 mg EC tablet, Take by mouth., Disp: , Rfl:     atorvastatin (Lipitor) 10 mg tablet, Take by mouth., Disp: , Rfl:      folic acid (Folvite) 1 mg tablet, Take by mouth., Disp: , Rfl:     metoprolol tartrate (Lopressor) 25 mg tablet, Take by mouth., Disp: , Rfl:     prednisoLONE acetate (Pred-Forte) 1 % ophthalmic suspension, Administer 1 drop into the right eye 4 times a day., Disp: , Rfl:     thiamine 100 mg tablet, Take by mouth., Disp: , Rfl:      Objective    BSA: 1.99 meters squared  /73 (BP Location: Left arm, Patient Position: Sitting)   Pulse 76   Temp 36.1 °C (97 °F) (Temporal)   Resp 16   Wt 84.1 kg (185 lb 6.5 oz)   SpO2 97%   BMI 29.07 kg/m²         Physical Exam    Constitutional: Well developed, awake/alert/oriented  x3, no distress, alert and cooperative   Eyes: EOMI, clear sclerae   Head/Neck: Neck supple, thyroid without mass or tenderness,  no JVD, trachea midline, no bruits   Respiratory/Thorax: Patent airways, CTAB, normal  breath sounds with good chest expansion, thorax symmetric   Cardiovascular: Regular, rate and rhythm, no murmurs,  2+ equal pulses of the extremities, normal S 1and S 2   Gastrointestinal: Nondistended, soft, non-tender,  no rebound tenderness or guarding, no masses, no organomegaly, +BS   Breast: left inverted nipple with palpated mass-    Psychological: Appropriate mood and behavior   Skin: Warm and dry, no lesions, no rashes       Laboratory Data:  Lab Results   Component Value Date    WBC 6.6 06/07/2023    HGB 12.7 06/07/2023    HCT 39.7 06/07/2023    MCV 92 06/07/2023     06/07/2023       Chemistry    Lab Results   Component Value Date/Time     06/07/2023 1243    K 4.0 06/07/2023 1243     06/07/2023 1243    CO2 29 06/07/2023 1243    BUN 19 06/07/2023 1243    CREATININE 0.97 06/07/2023 1243    Lab Results   Component Value Date/Time    CALCIUM 9.3 06/07/2023 1243    ALKPHOS 99 06/07/2023 1243    AST 22 06/07/2023 1243    ALT 11 06/07/2023 1243    BILITOT 0.4 06/07/2023 1243             Assessment/Plan:    83 year old female with multiple comorbidities, with  history of metastatic, stage IV breast cancer with recent PET scan showing irregular soft tissue left breast,  pleural-based right upper lobe nodule, supraclavicular subcarinal lymph nodes (new), and multiple osseous metastatic disease(new).  She had multiple lines of hormonal treatments, with chemotherapy not being considered because of her poor performance status. Review cancer history for details of her previous treatment. She  was on letrozole and Verzenio and her disease has been stable overall, she does mention having stopped Verzenio in the past 3 months at least because of diarrhea which would explain  her progression of disease.     PET cerianna torso on 10/13/22 showed the previously described FDG avid lesions consistent with sites of malignant involvement, these include irregular soft tissue lesion in the left  breast, the pleural-based right upper lobe pulmonary nodule in the focal lesion within the T8 vertebral body.  She has multiple additional osseous lesions consistent with menstrual slides of osseous metastatic disease with prominent subcentimeter trick  right supraclavicular and subcarinal lymph nodes demonstrating focal F-18 fluoroestradiall level and avidity consistent with alla mets-      discussed with her the results of her PET/CT on and we agreed to restart Verzenio at a lower dose 100mg daily and to follow-up closely.  she was taking verzenio every couple of days and then stopped.     repeat scans reviewed: she has increaase in lung nodule and subcarinal LN, new bone mets- we discussed no treatment with fup scans vs restarting verzenio- agreable to restart verzenio once a day- (that was in june and she never started it)    Started verzenio then stopped because of diarrhea- and per discussion Edilma wants to preserve a good quality of life.  In the meantime, instructed to call for any questions/concerns. We did discuss that cancer will get worse off treatment. Agreable with plan. Discussed in  presence of JAIMIE Jackson. Discontinuing Verzenio- Plan to order to scans today    RTC after scans     At least 35 minutes of direct consultation was spent reviewing the patient's chart as well as discussing and  reviewing the  cancer care plan including educating and answering  questions and concerns, greater than 50 percent spent in counseling and coordination  of care.            Alley Kapadia MD  Hematology and Medical Oncology  [facility]

## 2024-03-21 ENCOUNTER — HOSPITAL ENCOUNTER (OUTPATIENT)
Dept: RADIOLOGY | Facility: CLINIC | Age: 84
Discharge: HOME | End: 2024-03-21
Payer: MEDICARE

## 2024-03-21 DIAGNOSIS — Z17.0 MALIGNANT NEOPLASM OF UPPER-OUTER QUADRANT OF LEFT BREAST IN FEMALE, ESTROGEN RECEPTOR POSITIVE (MULTI): ICD-10-CM

## 2024-03-21 DIAGNOSIS — C50.412 MALIGNANT NEOPLASM OF UPPER-OUTER QUADRANT OF LEFT BREAST IN FEMALE, ESTROGEN RECEPTOR POSITIVE (MULTI): ICD-10-CM

## 2024-03-21 PROCEDURE — 71260 CT THORAX DX C+: CPT | Performed by: RADIOLOGY

## 2024-03-21 PROCEDURE — 74177 CT ABD & PELVIS W/CONTRAST: CPT | Performed by: RADIOLOGY

## 2024-03-21 PROCEDURE — 78306 BONE IMAGING WHOLE BODY: CPT | Performed by: RADIOLOGY

## 2024-03-21 PROCEDURE — 2550000001 HC RX 255 CONTRASTS: Performed by: STUDENT IN AN ORGANIZED HEALTH CARE EDUCATION/TRAINING PROGRAM

## 2024-03-21 PROCEDURE — 3430000001 HC RX 343 DIAGNOSTIC RADIOPHARMACEUTICALS: Performed by: STUDENT IN AN ORGANIZED HEALTH CARE EDUCATION/TRAINING PROGRAM

## 2024-03-21 PROCEDURE — 74177 CT ABD & PELVIS W/CONTRAST: CPT

## 2024-03-21 PROCEDURE — 78306 BONE IMAGING WHOLE BODY: CPT

## 2024-03-21 PROCEDURE — A9503 TC99M MEDRONATE: HCPCS | Performed by: STUDENT IN AN ORGANIZED HEALTH CARE EDUCATION/TRAINING PROGRAM

## 2024-03-21 RX ADMIN — IOHEXOL 90 ML: 350 INJECTION, SOLUTION INTRAVENOUS at 09:21

## 2024-03-21 RX ADMIN — TECHNETIUM TC 99M MEDRONATE 27 MILLICURIE: 25 INJECTION, POWDER, FOR SOLUTION INTRAVENOUS at 08:30

## 2024-04-03 ENCOUNTER — OFFICE VISIT (OUTPATIENT)
Dept: HEMATOLOGY/ONCOLOGY | Facility: CLINIC | Age: 84
End: 2024-04-03
Payer: MEDICARE

## 2024-04-03 VITALS
RESPIRATION RATE: 14 BRPM | HEART RATE: 60 BPM | OXYGEN SATURATION: 97 % | TEMPERATURE: 96.8 F | SYSTOLIC BLOOD PRESSURE: 125 MMHG | DIASTOLIC BLOOD PRESSURE: 69 MMHG

## 2024-04-03 DIAGNOSIS — Z17.0 MALIGNANT NEOPLASM OF UPPER-OUTER QUADRANT OF LEFT BREAST IN FEMALE, ESTROGEN RECEPTOR POSITIVE (MULTI): ICD-10-CM

## 2024-04-03 DIAGNOSIS — C50.412 MALIGNANT NEOPLASM OF UPPER-OUTER QUADRANT OF LEFT BREAST IN FEMALE, ESTROGEN RECEPTOR POSITIVE (MULTI): ICD-10-CM

## 2024-04-03 PROCEDURE — 99214 OFFICE O/P EST MOD 30 MIN: CPT | Performed by: STUDENT IN AN ORGANIZED HEALTH CARE EDUCATION/TRAINING PROGRAM

## 2024-04-03 PROCEDURE — 1159F MED LIST DOCD IN RCRD: CPT | Performed by: STUDENT IN AN ORGANIZED HEALTH CARE EDUCATION/TRAINING PROGRAM

## 2024-04-03 PROCEDURE — 1157F ADVNC CARE PLAN IN RCRD: CPT | Performed by: STUDENT IN AN ORGANIZED HEALTH CARE EDUCATION/TRAINING PROGRAM

## 2024-04-03 PROCEDURE — 1126F AMNT PAIN NOTED NONE PRSNT: CPT | Performed by: STUDENT IN AN ORGANIZED HEALTH CARE EDUCATION/TRAINING PROGRAM

## 2024-04-03 ASSESSMENT — PAIN SCALES - GENERAL: PAINLEVEL: 0-NO PAIN

## 2024-04-03 NOTE — PROGRESS NOTES
Patient ID: Edilma Davis is a 83 y.o. female.    The patient presents to clinic today for her history of breast cancer.    Diagnostic/Therapeutic History:  Cancer History:          Breast Cancer         AJCC Edition: 7th, Diagnosis Date: 10-Jul-2013, Stage IIB, T2 pN1 M0      Treatment Synopsis:    1. Clinical 4 cm left-sided invasive breast cancer. Biopsy revealing invasive breast cancer. Estrogen receptor strongly positive at greater than 95%, progesterone  receptor weakly positive at 20%, and HER-2/tyrel negative by immunohistochemistry with a score of 0.      2. Biopsy of a left axillary lymph node is positive for cancer.      3. Bone scan is negative for metastases. 4. Computed tomography of the chest revealing some small 3 mm scattered nodules. Computed tomography of the abdomen revealing multiple liver cysts confirmed by ultrasound. 5. Started on exemestane. 6. Episode  of TIA but elected to continue on exemestane.     8. Progression in left breast and axilla by mammography, so exemestane stopped and started on Tamoxifen on 8/10/18.      9. Repeat PET/CT showing uptake in left breast and axilla, as well as new nodule in right lobe of the lung, so tamoxifen stopped and started on letrozole.      10. Progression on letrozole, so abemaciclib added to treatment.      11.PET cerianna torso on 10/13/22 showed the previously described FDG avid lesions consistent with sites of malignant involvement, these include irregular soft tissue lesion in the left breast, the pleural-based right upper lobe pulmonary nodule in the  focal lesion within the T8 vertebral body.  She has multiple additional osseous lesions consistent with menstrual slides of osseous metastatic disease with prominent subcentimeter trick right supraclavicular and subcarinal lymph nodes demonstrating focal  F-18 fluoroestradiall level and avidity consistent with alla mets- To note that patient has been off verzenio for few months, she stopped it, agreed  to restart at lower dose but has been taking intermittently, however has stopped      05/2023: scans reviewed: she has increaase in lung nodule and subcarinal LN, new bone mets- agreed to restart verzenio- was taking it intermittently stopped in late september 03/21/2024: scans show worsening lung and bone mets;left  breast mass increasing in size    History of Present Illness (HPI)/Interval History:    03/21/2024: scans show worsening lung and bone mets;left  breast mass increasing in size. Overall doing okay, has intermittent back pain. No fever. No chills, no chest pain, no shortness of breath         Review of Systems:  14-point ROS otherwise negative, as per HPI.    Past Medical History:   Diagnosis Date    Essential (primary) hypertension 08/18/2013    Benign essential hypertension    Malignant neoplasm of unspecified site of left female breast (CMS/HCC) 02/28/2020    Breast cancer, left breast       Past Surgical History:   Procedure Laterality Date    CATARACT EXTRACTION  09/19/2017    Cataract Extraction    TONSILLECTOMY  04/02/2014    Tonsillectomy       Social History     Socioeconomic History    Marital status:      Spouse name: None    Number of children: None    Years of education: None    Highest education level: None   Occupational History    None   Tobacco Use    Smoking status: None    Smokeless tobacco: None   Substance and Sexual Activity    Alcohol use: None    Drug use: None    Sexual activity: None   Other Topics Concern    None   Social History Narrative    None     Social Determinants of Health     Financial Resource Strain: Not on file   Food Insecurity: Not on file   Transportation Needs: Not on file   Physical Activity: Not on file   Stress: Not on file   Social Connections: Not on file   Intimate Partner Violence: Not on file   Housing Stability: Not on file       No Known Allergies      Current Outpatient Medications:     anastrozole (Arimidex) 1 mg tablet, Take by mouth.,  Disp: , Rfl:     aspirin 81 mg EC tablet, Take by mouth., Disp: , Rfl:     atorvastatin (Lipitor) 10 mg tablet, Take by mouth., Disp: , Rfl:     folic acid (Folvite) 1 mg tablet, Take by mouth., Disp: , Rfl:     metoprolol tartrate (Lopressor) 25 mg tablet, Take by mouth., Disp: , Rfl:     prednisoLONE acetate (Pred-Forte) 1 % ophthalmic suspension, Administer 1 drop into the right eye 4 times a day., Disp: , Rfl:     thiamine 100 mg tablet, Take by mouth., Disp: , Rfl:      Objective    BSA: There is no height or weight on file to calculate BSA.  /69 (BP Location: Left arm, Patient Position: Sitting)   Pulse 60   Temp 36 °C (96.8 °F) (Temporal)   Resp 14   SpO2 97%         Physical Exam    Constitutional: Well developed, awake/alert/oriented  x3, no distress, alert and cooperative   Eyes: EOMI, clear sclerae   Head/Neck: Neck supple, thyroid without mass or tenderness,  no JVD, trachea midline, no bruits   Respiratory/Thorax: Patent airways, CTAB, normal  breath sounds with good chest expansion, thorax symmetric   Cardiovascular: Regular, rate and rhythm, no murmurs,  2+ equal pulses of the extremities, normal S 1and S 2   Gastrointestinal: Nondistended, soft, non-tender,  no rebound tenderness or guarding, no masses, no organomegaly, +BS   Breast: left inverted nipple with palpated mass-    Psychological: Appropriate mood and behavior   Skin: Warm and dry, no lesions, no rashes       Laboratory Data:  Lab Results   Component Value Date    WBC 6.6 06/07/2023    HGB 12.7 06/07/2023    HCT 39.7 06/07/2023    MCV 92 06/07/2023     06/07/2023       Chemistry    Lab Results   Component Value Date/Time     06/07/2023 1243    K 4.0 06/07/2023 1243     06/07/2023 1243    CO2 29 06/07/2023 1243    BUN 19 06/07/2023 1243    CREATININE 0.89 03/20/2024 1042    Lab Results   Component Value Date/Time    CALCIUM 9.3 06/07/2023 1243    ALKPHOS 99 06/07/2023 1243    AST 22 06/07/2023 1243    ALT 11  06/07/2023 1243    BILITOT 0.4 06/07/2023 1243             Assessment/Plan:    83 year old female with multiple comorbidities, with history of metastatic, stage IV breast cancer with recent PET scan showing irregular soft tissue left breast,  pleural-based right upper lobe nodule, supraclavicular subcarinal lymph nodes (new), and multiple osseous metastatic disease(new).  She had multiple lines of hormonal treatments, with chemotherapy not being considered because of her poor performance status. Review cancer history for details of her previous treatment. She  was on letrozole and Verzenio and her disease has been stable overall, she does mention having stopped Verzenio in the past 3 months at least because of diarrhea which would explain  her progression of disease.     PET cerianna torso on 10/13/22 showed the previously described FDG avid lesions consistent with sites of malignant involvement, these include irregular soft tissue lesion in the left  breast, the pleural-based right upper lobe pulmonary nodule in the focal lesion within the T8 vertebral body.  She has multiple additional osseous lesions consistent with menstrual slides of osseous metastatic disease with prominent subcentimeter trick  right supraclavicular and subcarinal lymph nodes demonstrating focal F-18 fluoroestradiall level and avidity consistent with alla mets-      Previously discussed with her the results of her PET/CT on and we agreed to restart Verzenio at a lower dose 100mg daily and to follow-up closely.  she was taking verzenio every couple of days and then stopped.     repeat scans reviewed: she has increaase in lung nodule and subcarinal LN, new bone mets- we discussed no treatment with fup scans vs restarting verzenio- previously agreable to restart verzenio once a day- (that was in june and she never started it)    Started verzenio then stopped because of diarrhea- and per discussion Edilma wants to preserve a good quality of life,   so verzenio was DC. 03/21/2024: scans show worsening lung and bone mets;left  breast mass increasing in size.   We did discuss options are for resuming verzenio with no guarantee that it will help stabilize her cancer, keeping in mind that it has side effects including diarrhea, hair loss, nausea, fatigue, increased LFTs vs supportive care only and maintaining a good quality of life. Discussed with JAIMIE Jackson and Edilma and decision was to monitor with no treatment and follow up in 2 months or earlier if needed    RTC in   Follow up with supportive oncology for pain management and overall GOC      At least 35 minutes of direct consultation was spent reviewing the patient's chart as well as discussing and  reviewing the  cancer care plan including educating and answering  questions and concerns, greater than 50 percent spent in counseling and coordination  of care.            Alley Kapadia MD  Hematology and Medical Oncology  [facility]

## 2024-04-04 ENCOUNTER — TELEPHONE (OUTPATIENT)
Dept: PALLIATIVE MEDICINE | Facility: HOSPITAL | Age: 84
End: 2024-04-04
Payer: MEDICARE

## 2024-04-04 NOTE — TELEPHONE ENCOUNTER
Spoke with CECY Storey at [facility] who states that [facility] ( may provide) and [facility] ( does  provide) palliative care services in this facility. Message left for Anthony Jackson re: which  agency he would like for in home palliative care services.   Received return call from Mr. Jackson ; above information shared with him. In home palliative care referral sent to [facility] Fax # [phone]. Teams  updated via email.

## 2024-06-05 ENCOUNTER — APPOINTMENT (OUTPATIENT)
Dept: HEMATOLOGY/ONCOLOGY | Facility: CLINIC | Age: 84
End: 2024-06-05
Payer: MEDICARE